# Patient Record
Sex: MALE | Race: WHITE | NOT HISPANIC OR LATINO | Employment: FULL TIME | URBAN - METROPOLITAN AREA
[De-identification: names, ages, dates, MRNs, and addresses within clinical notes are randomized per-mention and may not be internally consistent; named-entity substitution may affect disease eponyms.]

---

## 2020-12-08 ENCOUNTER — OFFICE VISIT (OUTPATIENT)
Dept: FAMILY MEDICINE CLINIC | Facility: CLINIC | Age: 30
End: 2020-12-08

## 2020-12-08 VITALS
SYSTOLIC BLOOD PRESSURE: 146 MMHG | OXYGEN SATURATION: 98 % | TEMPERATURE: 97.6 F | WEIGHT: 205 LBS | DIASTOLIC BLOOD PRESSURE: 92 MMHG | HEART RATE: 87 BPM | BODY MASS INDEX: 27.77 KG/M2 | HEIGHT: 72 IN

## 2020-12-08 DIAGNOSIS — Z00.00 HEALTH MAINTENANCE EXAMINATION: Primary | ICD-10-CM

## 2020-12-08 DIAGNOSIS — R03.0 ELEVATED BLOOD PRESSURE READING: ICD-10-CM

## 2020-12-08 DIAGNOSIS — Z82.49 FAMILY HISTORY OF HYPERTENSION: ICD-10-CM

## 2020-12-08 DIAGNOSIS — H73.92 ABNORMAL TYMPANIC MEMBRANE OF LEFT EAR: ICD-10-CM

## 2020-12-08 PROCEDURE — 93005 ELECTROCARDIOGRAM TRACING: CPT | Performed by: FAMILY MEDICINE

## 2020-12-08 PROCEDURE — 99214 OFFICE O/P EST MOD 30 MIN: CPT | Performed by: FAMILY MEDICINE

## 2020-12-08 NOTE — PROGRESS NOTES
Subjective   Geovany Saenz is a 30 y.o. male    Chief Complaint    Establish primary care  Elevated blood pressure readings  Family history hypertension  Chronic left ear problems    History of Present Illness  Patient presents today as a new patient to establish primary care.  He is worried about his general health but also particularly worried about his elevated blood pressure readings.  He has chronic problems with his ears and recently in an ENT visit was noted to have an elevated blood pressure reading.  He admits that he is physically deconditioned and does not follow a healthy diet.  He also feels he drinks too much alcohol.  He is  with 1 child and his wife is expecting a second son.  Patient is employed as a manager at [employer].  He is originally from [facility] and attended [employer].  He went to college at [employer].  His ear issues apparently stem from a cholesteatoma and multiple surgeries including a mastoidectomy.  He has very poor hearing from his left ear.  He is followed by ear nose and throat physicians in [facility].  His blood pressure is modestly elevated today.  His EKG shows no LVH or strain pattern.  Extended discussion regarding changing his health habits, doing some health screening lab studies and follow-up in 3 to 4 months to see how he is doing.  Hopefully he can improve his general health and his blood pressure will respond favorably also.    The following portions of the patient's history were reviewed and updated as appropriate: allergies, current medications, past social history and problem list    Review of Systems   Constitutional: Negative.  Negative for fatigue and unexpected weight change.   HENT: Positive for ear discharge and hearing loss.    Eyes: Negative.    Respiratory: Negative.  Negative for cough, chest tightness and shortness of breath.    Cardiovascular: Negative.  Negative for chest pain, palpitations and leg swelling.    Gastrointestinal: Negative.  Negative for nausea.   Endocrine: Negative.    Genitourinary: Negative.    Musculoskeletal: Negative.    Skin: Negative.  Negative for color change and rash.   Allergic/Immunologic: Negative.    Neurological: Negative.  Negative for dizziness, syncope, weakness and headaches.   Hematological: Negative.    Psychiatric/Behavioral: Negative.    All other systems reviewed and are negative.      Objective     Vitals:    12/08/20 1250   BP: 146/92   Pulse: 87   Temp: 97.6 °F (36.4 °C)   SpO2: 98%       Physical Exam  Vitals signs and nursing note reviewed.   Constitutional:       Appearance: Normal appearance. He is well-developed and normal weight.   HENT:      Head: Normocephalic and atraumatic.      Ears:      Comments: Marked scarring and deformity left TM     Nose: Nose normal. No congestion.   Eyes:      General: No scleral icterus.     Conjunctiva/sclera: Conjunctivae normal.      Pupils: Pupils are equal, round, and reactive to light.   Neck:      Musculoskeletal: Normal range of motion and neck supple.      Thyroid: No thyromegaly.      Vascular: No JVD.   Cardiovascular:      Rate and Rhythm: Normal rate and regular rhythm.      Pulses: Normal pulses.      Heart sounds: Normal heart sounds. No murmur.   Pulmonary:      Effort: Pulmonary effort is normal. No respiratory distress.      Breath sounds: Normal breath sounds.   Abdominal:      General: Bowel sounds are normal.      Palpations: Abdomen is soft. There is no mass.      Tenderness: There is no abdominal tenderness.   Musculoskeletal: Normal range of motion.   Skin:     General: Skin is warm and dry.   Neurological:      General: No focal deficit present.      Mental Status: He is alert and oriented to person, place, and time.      Cranial Nerves: No cranial nerve deficit.      Sensory: No sensory deficit.      Motor: No weakness.      Coordination: Coordination normal.      Gait: Gait normal.      Deep Tendon Reflexes:  Reflexes are normal and symmetric. Reflexes normal.   Psychiatric:         Mood and Affect: Mood normal.         Behavior: Behavior normal.         Thought Content: Thought content normal.       ECG 12 Lead    Date/Time: 12/8/2020 5:02 PM  Performed by: Porfirio Orozco MD  Authorized by: Porfirio Orozco MD   Comparison: not compared with previous ECG   Rhythm: sinus rhythm and sinus arrhythmia  Rate: normal  Conduction: conduction normal  ST Segments: ST segments normal  T Waves: T waves normal  QRS axis: normal  Other findings: non-specific ST-T wave changes    Clinical impression: non-specific ECG            Assessment/Plan   Problems Addressed this Visit     None      Diagnoses    None.

## 2021-03-30 ENCOUNTER — OFFICE VISIT (OUTPATIENT)
Dept: FAMILY MEDICINE CLINIC | Facility: CLINIC | Age: 31
End: 2021-03-30

## 2021-03-30 VITALS
BODY MASS INDEX: 26.41 KG/M2 | HEART RATE: 88 BPM | DIASTOLIC BLOOD PRESSURE: 90 MMHG | SYSTOLIC BLOOD PRESSURE: 128 MMHG | RESPIRATION RATE: 15 BRPM | OXYGEN SATURATION: 99 % | WEIGHT: 195 LBS | HEIGHT: 72 IN

## 2021-03-30 DIAGNOSIS — L74.510 HYPERHIDROSIS OF AXILLA: ICD-10-CM

## 2021-03-30 DIAGNOSIS — R03.0 ELEVATED BLOOD PRESSURE READING: Primary | ICD-10-CM

## 2021-03-30 PROCEDURE — 99213 OFFICE O/P EST LOW 20 MIN: CPT | Performed by: FAMILY MEDICINE

## 2021-08-05 DIAGNOSIS — L74.510 HYPERHIDROSIS OF AXILLA: ICD-10-CM

## 2021-08-10 RX ORDER — ALUMINUM CHLORIDE 20 %
SOLUTION, NON-ORAL TOPICAL
Qty: 37.5 EACH | Refills: 0 | Status: SHIPPED | OUTPATIENT
Start: 2021-08-10 | End: 2021-09-23 | Stop reason: SDUPTHER

## 2021-09-23 DIAGNOSIS — L74.510 HYPERHIDROSIS OF AXILLA: ICD-10-CM

## 2021-09-23 RX ORDER — ALUMINUM CHLORIDE 20 %
SOLUTION, NON-ORAL TOPICAL
Qty: 37.5 EACH | Refills: 0 | Status: SHIPPED | OUTPATIENT
Start: 2021-09-23 | End: 2021-12-27

## 2021-09-23 NOTE — TELEPHONE ENCOUNTER
Caller: Geovany Saenz    Relationship: Self      Medication requested (name and dosage): Drysol 20 % external solution    Pharmacy where request should be sent: [facility] AT [facility] [address] - [phone]  - [phone] FX  [phone]    Additional details provided by patient: PATIENT STATES HE WENT ON A BUSINESS TRIP TO [facility] AND LEFT THE PRESCRIPTION IN HIS HOTEL ROOM.     Best call back number:[phone]     Does the patient have less than a 3 day supply:  [x] Yes  [] No    Chris Casiano Rep   09/23/21 11:24 EDT

## 2021-12-24 DIAGNOSIS — L74.510 HYPERHIDROSIS OF AXILLA: ICD-10-CM

## 2021-12-27 RX ORDER — ALUMINUM CHLORIDE 20 %
SOLUTION, NON-ORAL TOPICAL
Qty: 37.5 EACH | Refills: 0 | Status: SHIPPED | OUTPATIENT
Start: 2021-12-27 | End: 2022-03-03

## 2022-03-03 DIAGNOSIS — L74.510 HYPERHIDROSIS OF AXILLA: ICD-10-CM

## 2022-03-03 RX ORDER — ALUMINUM CHLORIDE 20 %
SOLUTION, NON-ORAL TOPICAL
Qty: 37.5 EACH | Refills: 0 | Status: SHIPPED | OUTPATIENT
Start: 2022-03-03 | End: 2022-05-27

## 2022-05-26 DIAGNOSIS — L74.510 HYPERHIDROSIS OF AXILLA: ICD-10-CM

## 2022-05-27 RX ORDER — ALUMINUM CHLORIDE 20 %
SOLUTION, NON-ORAL TOPICAL
Qty: 37.5 EACH | Refills: 0 | Status: SHIPPED | OUTPATIENT
Start: 2022-05-27 | End: 2022-08-09 | Stop reason: SDUPTHER

## 2022-07-18 ENCOUNTER — TELEPHONE (OUTPATIENT)
Dept: FAMILY MEDICINE CLINIC | Facility: CLINIC | Age: 32
End: 2022-07-18

## 2022-07-18 RX ORDER — NAPROXEN SODIUM 550 MG/1
550 TABLET ORAL 2 TIMES DAILY WITH MEALS
Qty: 20 TABLET | Refills: 0 | Status: SHIPPED | OUTPATIENT
Start: 2022-07-18

## 2022-07-18 NOTE — TELEPHONE ENCOUNTER
Patient has only been seen in this office on 2 prior occasions over the past 3 years.  He probably should be seen but I will call him in some medication and if he does not get better he needs an appointment

## 2022-07-18 NOTE — TELEPHONE ENCOUNTER
Caller: Geovany Saenz    Relationship: Self    Best call back number: [phone]     What medication are you requesting: MEDICATION REQUEST    What are your current symptoms: SHOULDER AND NECK PAIN    How long have you been experiencing symptoms: A COUPLE DAYS    Have you had these symptoms before:    [x] Yes  [] No    Have you been treated for these symptoms before:   [] Yes  [x] No    If a prescription is needed, what is your preferred pharmacy and [address] [facility] [address] [facility] [phone] [facility] [phone] FX     Additional notes: PATIENT STATED THAT HE HAS BEEN HAVING SOME UNCOMFORTABLENESS WITH HIS NECK AND SHOULDER THAT KEEPS HIM FROM TURNING HIS HEAD AND IS NOT ABLE TO GET ANY SLEEP WITH THE PAIN FOR A COUPLE SAYS NOW AND WANTED TO SEE WHAT PROVIDER WOULD WANT HIM TO DO AS WELL AS SENDING MEDICATION FOR MUSCLE PAIN INTO PHARMACY     PLEASE ADVISE

## 2022-08-09 DIAGNOSIS — L74.510 HYPERHIDROSIS OF AXILLA: ICD-10-CM

## 2022-08-09 RX ORDER — ALUMINUM CHLORIDE 20 %
SOLUTION, NON-ORAL TOPICAL
Qty: 37.5 EACH | Refills: 0 | Status: SHIPPED | OUTPATIENT
Start: 2022-08-09 | End: 2022-11-17

## 2022-08-09 NOTE — TELEPHONE ENCOUNTER
Caller: Geovany Saenz    Relationship: Self    Best call back number: [phone]    Requested Prescriptions:   Requested Prescriptions     Pending Prescriptions Disp Refills   • aluminum chloride (Drysol) 20 % external solution 37.5 each 0     Sig: APPLY TO AFFECTED AREA(S) EVERY NIGHT AT BEDTIME        Pharmacy where request should be sent: [facility] [address] AT [facility] - [phone]  - [phone] FX       Does the patient have less than a 3 day supply:  [x] Yes  [] No    Chris Hill Rep   08/09/22 10:50 EDT

## 2022-11-16 DIAGNOSIS — L74.510 HYPERHIDROSIS OF AXILLA: ICD-10-CM

## 2022-11-17 RX ORDER — ALUMINUM CHLORIDE 20 %
SOLUTION, NON-ORAL TOPICAL
Qty: 37.5 EACH | Refills: 0 | Status: SHIPPED | OUTPATIENT
Start: 2022-11-17

## 2023-04-10 DIAGNOSIS — L74.510 HYPERHIDROSIS OF AXILLA: ICD-10-CM

## 2023-04-11 RX ORDER — ALUMINUM CHLORIDE 20 %
SOLUTION, NON-ORAL TOPICAL
Qty: 37.5 EACH | Refills: 0 | Status: CANCELLED | OUTPATIENT
Start: 2023-04-11

## 2023-04-19 DIAGNOSIS — L74.510 HYPERHIDROSIS OF AXILLA: ICD-10-CM

## 2023-04-19 RX ORDER — ALUMINUM CHLORIDE 20 %
SOLUTION, NON-ORAL TOPICAL
Qty: 35 EACH | Refills: 1 | Status: SHIPPED | OUTPATIENT
Start: 2023-04-19

## 2023-05-11 ENCOUNTER — OFFICE VISIT (OUTPATIENT)
Dept: FAMILY MEDICINE CLINIC | Facility: CLINIC | Age: 33
End: 2023-05-11
Payer: COMMERCIAL

## 2023-05-11 VITALS
BODY MASS INDEX: 27.97 KG/M2 | DIASTOLIC BLOOD PRESSURE: 88 MMHG | HEIGHT: 72 IN | SYSTOLIC BLOOD PRESSURE: 132 MMHG | TEMPERATURE: 98 F | WEIGHT: 206.5 LBS | HEART RATE: 82 BPM | OXYGEN SATURATION: 98 % | RESPIRATION RATE: 12 BRPM

## 2023-05-11 DIAGNOSIS — R06.83 SNORING: Primary | ICD-10-CM

## 2023-05-11 DIAGNOSIS — R40.0 DAYTIME SLEEPINESS: ICD-10-CM

## 2023-05-11 DIAGNOSIS — F41.9 ANXIETY: ICD-10-CM

## 2023-05-11 PROCEDURE — 99214 OFFICE O/P EST MOD 30 MIN: CPT | Performed by: FAMILY MEDICINE

## 2023-05-12 NOTE — PROGRESS NOTES
"Subjective   Geovany Saenz is a 33 y.o. male    Chief Complaint    Snoring  Daytime sleepiness  Anxiety    History of Present Illness  The patient reports that he snores very loudly and wakes up feeling very tired and sleepy with daytime sleepiness. He believes that he has sleep apnea. The patient would like to be referred for a sleep study. All of his life has been riddled with anxiety and nervousness. The patient reports increased job and life stressor events. He is asking about taking medications to help.    The patient notes that he needs to lose approximately 10 pounds. He does not eat junk food or rice every night. He quit drinking IPA beer.     The patient would like to do some sort of sleep study or talk to someone about the quality of his sleep. He gets 9 hours of sleep a night, but he does not feel rested when he wakes up. The patient's wife told him he snores like a \"freak,\" and his breathing is sporadic. In the past, he was told that the opening in the back of his throat is small.    The patient's mother and everyone on his father's side of the family except his father takes medication for anxiety. He used to take anti-anxiety medication when he was a teenager.  At one time he thinks he was diagnosed with ADHD. He thinks that he may need some anti-anxiety medication.    The patient is the  at [employer].    The following portions of the patient's history were reviewed and updated as appropriate: allergies, current medications, past social history and problem list    Review of Systems   Constitutional: Negative for appetite change and fatigue.   Eyes: Negative.    Respiratory: Negative for chest tightness and shortness of breath.    Cardiovascular: Negative for chest pain and palpitations.   Gastrointestinal: Negative for abdominal pain, diarrhea and nausea.   Musculoskeletal: Negative.    Skin: Negative.    Allergic/Immunologic: Negative for immunocompromised state.   Neurological: " Negative for dizziness, tremors, weakness, light-headedness and headaches.   Hematological: Negative for adenopathy. Does not bruise/bleed easily.   Psychiatric/Behavioral: Positive for dysphoric mood and sleep disturbance. Negative for agitation, behavioral problems, confusion, decreased concentration and suicidal ideas. The patient is nervous/anxious.        Objective     Vitals:    05/11/23 1526   BP: 132/88   Pulse: 82   Resp: 12   Temp: 98 °F (36.7 °C)   SpO2: 98%       Physical Exam  Vitals and nursing note reviewed.   Constitutional:       Appearance: Normal appearance.   HENT:      Head: Normocephalic and atraumatic.      Nose: Nose normal.      Mouth/Throat:      Mouth: Mucous membranes are moist.      Pharynx: Oropharynx is clear.   Eyes:      Conjunctiva/sclera: Conjunctivae normal.      Pupils: Pupils are equal, round, and reactive to light.   Cardiovascular:      Rate and Rhythm: Normal rate and regular rhythm.   Pulmonary:      Effort: Pulmonary effort is normal.      Breath sounds: Normal breath sounds. No wheezing.   Musculoskeletal:      Cervical back: Neck supple.   Lymphadenopathy:      Cervical: No cervical adenopathy.   Skin:     General: Skin is warm and dry.   Neurological:      General: No focal deficit present.      Mental Status: He is alert and oriented to person, place, and time.   Psychiatric:         Mood and Affect: Mood normal.         Behavior: Behavior normal.         Assessment & Plan   Problems Addressed this Visit    None  Visit Diagnoses     Snoring    -  Primary    Relevant Orders    Ambulatory Referral to Sleep Medicine    Daytime sleepiness        Relevant Orders    Ambulatory Referral to Sleep Medicine    Anxiety        Relevant Medications    sertraline (Zoloft) 50 MG tablet      Diagnoses       Codes Comments    Snoring    -  Primary ICD-10-CM: R06.83  ICD-9-CM: 786.09     Daytime sleepiness     ICD-10-CM: R40.0  ICD-9-CM: 780.54     Anxiety     ICD-10-CM: F41.9  ICD-9-CM:  300.00           Plan    Referral for sleep evaluation    Start sertraline each evening as listed above.    Follow-up in due annual checkup in 3 months.    Consider referral for ADHD at that time.    I spent 25 minutes in patient care: Reviewing records prior to the visit, examining the patient, entering orders and documentation    Part of this note may be an electronic transcription/translation of spoken language to printed text using the Dragon Dictation System.           Transcribed from ambient dictation for KIERRA Orozco MD by Pauly De Jesus.  05/11/23   21:10 EDT    Patient or patient representative verbalized consent to the visit recording.  I have personally performed the services described in this document as transcribed by the above individual, and it is both accurate and complete.

## 2024-04-25 DIAGNOSIS — F41.9 ANXIETY: ICD-10-CM

## 2024-11-08 DIAGNOSIS — F41.9 ANXIETY: ICD-10-CM

## 2024-11-08 NOTE — TELEPHONE ENCOUNTER
Caller: Geovany Saenz    Relationship: Self    Best call back number: [phone]     Requested Prescriptions:   Requested Prescriptions     Pending Prescriptions Disp Refills    sertraline (ZOLOFT) 50 MG tablet 30 tablet 1     Sig: Take 1 tablet by mouth Every Evening.      PATIENT ALSO REQUESTING PRESCRIPTION DEODORANT    Pharmacy where request should be sent:      Last office visit with prescribing clinician: 5/11/2023   Last telemedicine visit with prescribing clinician: Visit date not found   Next office visit with prescribing clinician: Visit date not found     Additional details provided by patient: OUT OF MEDICATION     Does the patient have less than a 3 day supply:  [x] Yes  [] No    Would you like a call back once the refill request has been completed: [] Yes [x] No    If the office needs to give you a call back, can they leave a voicemail: [] Yes [x] No    Chris Archuleta Rep   11/08/24 08:26 EST

## 2024-11-18 ENCOUNTER — TELEPHONE (OUTPATIENT)
Dept: FAMILY MEDICINE CLINIC | Facility: CLINIC | Age: 34
End: 2024-11-18

## 2024-11-18 RX ORDER — ALUMINUM CHLORIDE 20 %
SOLUTION, NON-ORAL TOPICAL NIGHTLY
Qty: 35 ML | Refills: 2 | Status: SHIPPED | OUTPATIENT
Start: 2024-11-18

## 2024-11-18 NOTE — TELEPHONE ENCOUNTER
Caller: Geovany Saenz    Relationship: Self    Best call back number: [phone]     What medication are you requesting: DRYSOL,   RX DEODERANT    What are your current symptoms: EXCESSIVE SWEAT.      How long have you been experiencing symptoms:  10 YEARS     Have you had these symptoms before:    [x] Yes  [] No    Have you been treated for these symptoms before:   [x] Yes  [] No    If a prescription is needed, what is your preferred pharmacy and phone number: [facility] [id number] - [place] [address] AT [facility] [address] - [phone]  - [phone] FX

## 2025-06-13 ENCOUNTER — TELEPHONE (OUTPATIENT)
Dept: FAMILY MEDICINE CLINIC | Facility: CLINIC | Age: 35
End: 2025-06-13

## 2025-06-13 NOTE — TELEPHONE ENCOUNTER
PATIENT CALLED ABOUT MEDICATION REFILLS AND AN ANTIBIOTIC FOR AN EAR INFECTION DUE TO GOING ON VACATION.    PATIENT HAS MADE A NEW PATIENT APPOINTMENT TO RE-ESTABLISH CARE WITH CAMRYN FLOREZ. SEVERAL APPOINTMENTS IN HIS CHART WERE EITHER NO SHOW OR CANCELLATIONS.     DIRECTED PATIENT TO URGENT CARE FOR EAR INFECTION. PATIENT EXPRESSED UNDERSTANDING AND AGREEMENT.

## 2025-06-13 NOTE — TELEPHONE ENCOUNTER
Caller: DanyGeovany pettit    Relationship: Self    Best call back number: [phone]   Requested Prescriptions:   Requested Prescriptions     Pending Prescriptions Disp Refills    aluminum chloride (Drysol) 20 % external solution 35 mL 2     Sig: Apply  topically to the appropriate area as directed Every Night.        Pharmacy where request should be sent: [facility] [id number] [address]  AT [facility] [address] - [phone]  - [phone] FX     Last office visit with prescribing clinician: Visit date not found   Last telemedicine visit with prescribing clinician: Visit date not found   Next office visit with prescribing clinician: Visit date not found     Additional details provided by patient: PATIENT HAS LESS THAN 3 DAYS     Does the patient have less than a 3 day supply:  [x] Yes  [] No    Would you like a call back once the refill request has been completed: [] Yes [x] No    If the office needs to give you a call back, can they leave a voicemail: [] Yes [x] No    Yelena Solitario MA   06/13/25 11:54 EDT

## 2025-06-13 NOTE — TELEPHONE ENCOUNTER
Caller: Geovany Saenz    Relationship: Self    Best call back number: [phone]     What medication are you requesting: ANTIBIOTIC  FOR AN EAR INFECTION     What are your current symptoms: EAR INFECTION, LEFT EAR DRAINAGE VERY COMMON HAS BEEN DEALING WITH REOCCURRING EAR INFECTION SINCE HE WAS 12    Have you had these symptoms before:    [x] Yes  [] No    Have you been treated for these symptoms before:   [x] Yes  [] No    If a prescription is needed, what is your preferred pharmacy and phone number: [facility] [id number] - [place] [id number] [facility] [address] AT [facility] [address] - [phone]  - [phone] FX     Additional notes:PATIENT LEAVES FOR VACATION ON MONDAY..  PATIENT HAS EAR DROPS, BUT WOULD LIKE TO GET AHEAD OF THIS BEFORE HE GOES ON VACATION

## 2025-06-24 RX ORDER — ALUMINUM CHLORIDE 20 %
SOLUTION, NON-ORAL TOPICAL NIGHTLY
Qty: 35 ML | Refills: 2 | Status: SHIPPED | OUTPATIENT
Start: 2025-06-24